# Patient Record
Sex: FEMALE | Race: WHITE | URBAN - METROPOLITAN AREA
[De-identification: names, ages, dates, MRNs, and addresses within clinical notes are randomized per-mention and may not be internally consistent; named-entity substitution may affect disease eponyms.]

---

## 2023-01-06 ENCOUNTER — OFFICE VISIT (OUTPATIENT)
Dept: URBAN - METROPOLITAN AREA CLINIC 107 | Facility: CLINIC | Age: 28
End: 2023-01-06
Payer: COMMERCIAL

## 2023-01-06 ENCOUNTER — TELEPHONE ENCOUNTER (OUTPATIENT)
Dept: URBAN - METROPOLITAN AREA CLINIC 113 | Facility: CLINIC | Age: 28
End: 2023-01-06

## 2023-01-06 VITALS
WEIGHT: 143 LBS | TEMPERATURE: 98.3 F | HEIGHT: 67 IN | DIASTOLIC BLOOD PRESSURE: 64 MMHG | BODY MASS INDEX: 22.44 KG/M2 | SYSTOLIC BLOOD PRESSURE: 106 MMHG | HEART RATE: 81 BPM

## 2023-01-06 DIAGNOSIS — R10.13 EPIGASTRIC BURNING SENSATION: ICD-10-CM

## 2023-01-06 DIAGNOSIS — Z79.1 NSAID LONG-TERM USE: ICD-10-CM

## 2023-01-06 PROCEDURE — 99204 OFFICE O/P NEW MOD 45 MIN: CPT

## 2023-01-06 RX ORDER — SUCRALFATE 1 G/1
1 TABLET ON AN EMPTY STOMACH TABLET ORAL TWICE A DAY
Status: ACTIVE | COMMUNITY

## 2023-01-06 RX ORDER — PANTOPRAZOLE SODIUM 40 MG/1
1 TABLET TABLET, DELAYED RELEASE ORAL ONCE A DAY
Qty: 90 TABLET | Refills: 2 | OUTPATIENT
Start: 2023-01-06

## 2023-01-06 NOTE — HPI-TODAY'S VISIT:
27-year-old female presents for evaluation of epigastric abdominal pain.  She presented to ED at [facility] on 1/5/2023 with reports of epigastric pain ongoing for some time.  This radiated to the right upper quadrant.  Labs revealed normal CBC, CMP, lipase and urinalysis.  Abdominal ultrasound was normal.  Abdominal x-ray revealed no acute abnormality in the abdomen.  She awoke a few nights ago with severe epigastric burning pain. This happened a second time and last all night. This resolved the next morning after eating. This is when she presented to the ER. She has had relief from sucralfate until 4:30 this morning. The pain is now sharp along the bilateral costal margins. Hunger worsens the pain and eating somewhat helps. Denies nausea or vomiting. She has a chronic history of constipation however her bowels are more regular as of late. She attributes this to stress. She denies any recent rectal bleeding, but she has hemorrhoids in the past. Denies melena. Denies heartburn or dysphagia. Denies a family history of colon  cancer or GI cancers.   She does take ibuprofen at least every other day for headaches. She took 800 mg ibuprofen prior to the onset of pain for three days due to a head cold. She has been taking Ibuprofen for years.

## 2023-02-08 ENCOUNTER — TELEPHONE ENCOUNTER (OUTPATIENT)
Dept: URBAN - METROPOLITAN AREA CLINIC 113 | Facility: CLINIC | Age: 28
End: 2023-02-08

## 2023-02-08 ENCOUNTER — TELEPHONE ENCOUNTER (OUTPATIENT)
Dept: URBAN - METROPOLITAN AREA CLINIC 107 | Facility: CLINIC | Age: 28
End: 2023-02-08

## 2023-02-24 ENCOUNTER — OFFICE VISIT (OUTPATIENT)
Dept: URBAN - METROPOLITAN AREA SURGERY CENTER 25 | Facility: SURGERY CENTER | Age: 28
End: 2023-02-24

## 2023-03-24 ENCOUNTER — OFFICE VISIT (OUTPATIENT)
Dept: URBAN - METROPOLITAN AREA CLINIC 107 | Facility: CLINIC | Age: 28
End: 2023-03-24

## 2023-03-24 NOTE — HPI-TODAY'S VISIT:
27-year-old female presents for follow-up after EGD.  She was last seen on 1/6/2023.  She reported a new onset of epigastric burning with history of long-term excessive NSAID use.  ER evaluation included ultrasound, abdominal x-ray and labs was unremarkable.  Suspected PUD or gastritis.  She was planned for an EGD and started on a PPI. Patient called the office on 2/8 stating burning had resolved on medication.  She wished to defer EGD.  1/6/2023 27-year-old female presents for evaluation of epigastric abdominal pain.  She presented to ED at [facility] on 1/5/2023 with reports of epigastric pain ongoing for some time.  This radiated to the right upper quadrant.  Labs revealed normal CBC, CMP, lipase and urinalysis.  Abdominal ultrasound was normal.  Abdominal x-ray revealed no acute abnormality in the abdomen.  She awoke a few nights ago with severe epigastric burning pain. This happened a second time and last all night. This resolved the next morning after eating. This is when she presented to the ER. She has had relief from sucralfate until 4:30 this morning. The pain is now sharp along the bilateral costal margins. Hunger worsens the pain and eating somewhat helps. Denies nausea or vomiting. She has a chronic history of constipation however her bowels are more regular as of late. She attributes this to stress. She denies any recent rectal bleeding, but she has hemorrhoids in the past. Denies melena. Denies heartburn or dysphagia. Denies a family history of colon  cancer or GI cancers.   She does take ibuprofen at least every other day for headaches. She took 800 mg ibuprofen prior to the onset of pain for three days due to a head cold. She has been taking Ibuprofen for years.

## 2023-04-18 ENCOUNTER — OFFICE VISIT (OUTPATIENT)
Dept: URBAN - METROPOLITAN AREA CLINIC 107 | Facility: CLINIC | Age: 28
End: 2023-04-18

## 2023-04-18 RX ORDER — SUCRALFATE 1 G/1
1 TABLET ON AN EMPTY STOMACH TABLET ORAL TWICE A DAY
Status: ACTIVE | COMMUNITY

## 2023-04-18 RX ORDER — PANTOPRAZOLE SODIUM 40 MG/1
1 TABLET TABLET, DELAYED RELEASE ORAL ONCE A DAY
Qty: 90 TABLET | Refills: 2 | Status: ACTIVE | COMMUNITY
Start: 2023-01-06

## 2023-04-18 NOTE — HPI-TODAY'S VISIT:
This is a 27-year-old female with a history of epigastric burning and long-term excessive NSAID use presenting for follow-up after EGD. She was initially seen 1/6/2023 for evaluation of epigastric pain.  She had been to the emergency department at [facility] where she was evaluated with an abdominal ultrasound, abdominal x-ray, and CBC, CMP, lipase, and urinalysis.  Testing was normal/negative.  Peptic ulcer disease or gastritis was suspected.  She was prescribed pantoprazole and scheduled for an EGD. EGD was scheduled 2/24/2023 and was canceled.

## 2023-05-02 ENCOUNTER — TELEPHONE ENCOUNTER (OUTPATIENT)
Dept: URBAN - METROPOLITAN AREA CLINIC 107 | Facility: CLINIC | Age: 28
End: 2023-05-02

## 2023-07-14 ENCOUNTER — OFFICE VISIT (OUTPATIENT)
Dept: URBAN - METROPOLITAN AREA CLINIC 107 | Facility: CLINIC | Age: 28
End: 2023-07-14
Payer: COMMERCIAL

## 2023-07-14 VITALS
SYSTOLIC BLOOD PRESSURE: 96 MMHG | HEART RATE: 81 BPM | BODY MASS INDEX: 20.72 KG/M2 | DIASTOLIC BLOOD PRESSURE: 72 MMHG | TEMPERATURE: 97.3 F | WEIGHT: 132 LBS | RESPIRATION RATE: 16 BRPM | HEIGHT: 67 IN

## 2023-07-14 DIAGNOSIS — R10.13 EPIGASTRIC BURNING SENSATION: ICD-10-CM

## 2023-07-14 DIAGNOSIS — R19.5 LOOSE STOOLS: ICD-10-CM

## 2023-07-14 DIAGNOSIS — Z79.1 NSAID LONG-TERM USE: ICD-10-CM

## 2023-07-14 DIAGNOSIS — R63.4 WEIGHT LOSS: ICD-10-CM

## 2023-07-14 DIAGNOSIS — R11.0 NAUSEA: ICD-10-CM

## 2023-07-14 PROCEDURE — 99214 OFFICE O/P EST MOD 30 MIN: CPT

## 2023-07-14 RX ORDER — SUCRALFATE 1 G/1
1 TABLET ON AN EMPTY STOMACH TABLET ORAL TWICE A DAY
Status: ON HOLD | COMMUNITY

## 2023-07-14 RX ORDER — PANTOPRAZOLE SODIUM 40 MG/1
1 TABLET TABLET, DELAYED RELEASE ORAL TWICE DAILY
Qty: 180 | Refills: 1 | OUTPATIENT

## 2023-07-14 RX ORDER — PANTOPRAZOLE SODIUM 40 MG/1
1 TABLET TABLET, DELAYED RELEASE ORAL ONCE A DAY
Qty: 90 TABLET | Refills: 2 | Status: ACTIVE | COMMUNITY
Start: 2023-01-06

## 2023-07-14 NOTE — HPI-TODAY'S VISIT:
This is a 27-year-old female with a history of epigastric burning and long-term excessive NSAID use presenting for follow-up after EGD. She was initially seen 1/6/2023 for evaluation of epigastric pain.  She had been to the emergency department at [facility] where she was evaluated with an abdominal ultrasound, abdominal x-ray, and CBC, CMP, lipase, and urinalysis.  Testing was normal/negative.  Peptic ulcer disease or gastritis was suspected.  She was prescribed pantoprazole and scheduled for an EGD. EGD was scheduled 2/24/2023 and was canceled.  She states the EGD was too expensive however her symptoms had resolved on the PPI. Last week she began having recurrent epigastric burning, nausea and shakiness in the mornings. This is sometimes followed by 2-3 somewhat soft bowel movements. Symptoms seem to be driven by greasy, fatty foods. Denies vomiting or blood per rectum. She has also lost 10 pounds since her last visit dur to lack of appetite.

## 2023-08-01 ENCOUNTER — WEB ENCOUNTER (OUTPATIENT)
Dept: URBAN - METROPOLITAN AREA SURGERY CENTER 25 | Facility: SURGERY CENTER | Age: 28
End: 2023-08-01

## 2023-08-07 ENCOUNTER — OUT OF OFFICE VISIT (OUTPATIENT)
Dept: URBAN - METROPOLITAN AREA SURGERY CENTER 25 | Facility: SURGERY CENTER | Age: 28
End: 2023-08-07
Payer: COMMERCIAL

## 2023-08-07 ENCOUNTER — CLAIMS CREATED FROM THE CLAIM WINDOW (OUTPATIENT)
Dept: URBAN - METROPOLITAN AREA CLINIC 4 | Facility: CLINIC | Age: 28
End: 2023-08-07
Payer: COMMERCIAL

## 2023-08-07 ENCOUNTER — TELEPHONE ENCOUNTER (OUTPATIENT)
Dept: URBAN - METROPOLITAN AREA CLINIC 107 | Facility: CLINIC | Age: 28
End: 2023-08-07

## 2023-08-07 DIAGNOSIS — R10.13 EPIGASTRIC PAIN: ICD-10-CM

## 2023-08-07 DIAGNOSIS — K31.A0 GASTRIC INTESTINAL METAPLASIA, UNSPECIFIED: ICD-10-CM

## 2023-08-07 DIAGNOSIS — R12 HEARTBURN: ICD-10-CM

## 2023-08-07 DIAGNOSIS — K29.70 GASTRITIS, UNSPECIFIED, WITHOUT BLEEDING: ICD-10-CM

## 2023-08-07 DIAGNOSIS — R10.13 ABDOMINAL DISCOMFORT, EPIGASTRIC: ICD-10-CM

## 2023-08-07 DIAGNOSIS — K29.60 OTHER GASTRITIS WITHOUT BLEEDING: ICD-10-CM

## 2023-08-07 PROCEDURE — 00731 ANES UPR GI NDSC PX NOS: CPT | Performed by: ANESTHESIOLOGY

## 2023-08-07 PROCEDURE — 00731 ANES UPR GI NDSC PX NOS: CPT | Performed by: ANESTHESIOLOGIST ASSISTANT

## 2023-08-07 PROCEDURE — 43239 EGD BIOPSY SINGLE/MULTIPLE: CPT | Performed by: INTERNAL MEDICINE

## 2023-08-07 PROCEDURE — G8907 PT DOC NO EVENTS ON DISCHARG: HCPCS | Performed by: INTERNAL MEDICINE

## 2023-08-07 PROCEDURE — 88305 TISSUE EXAM BY PATHOLOGIST: CPT | Performed by: PATHOLOGY

## 2023-08-07 PROCEDURE — 88342 IMHCHEM/IMCYTCHM 1ST ANTB: CPT | Performed by: PATHOLOGY

## 2023-08-07 RX ORDER — PANTOPRAZOLE SODIUM 40 MG/1
1 TABLET TABLET, DELAYED RELEASE ORAL TWICE DAILY
Qty: 180 | Refills: 1 | Status: ACTIVE | COMMUNITY

## 2023-08-07 RX ORDER — SUCRALFATE 1 G/1
1 TABLET ON AN EMPTY STOMACH TABLET ORAL TWICE A DAY
Status: ON HOLD | COMMUNITY

## 2023-09-29 ENCOUNTER — OFFICE VISIT (OUTPATIENT)
Dept: URBAN - METROPOLITAN AREA CLINIC 107 | Facility: CLINIC | Age: 28
End: 2023-09-29
Payer: COMMERCIAL

## 2023-09-29 ENCOUNTER — DASHBOARD ENCOUNTERS (OUTPATIENT)
Age: 28
End: 2023-09-29

## 2023-09-29 VITALS
HEIGHT: 67 IN | HEART RATE: 69 BPM | WEIGHT: 127 LBS | TEMPERATURE: 98.2 F | BODY MASS INDEX: 19.93 KG/M2 | RESPIRATION RATE: 16 BRPM | SYSTOLIC BLOOD PRESSURE: 94 MMHG | DIASTOLIC BLOOD PRESSURE: 67 MMHG

## 2023-09-29 DIAGNOSIS — R63.4 WEIGHT LOSS: ICD-10-CM

## 2023-09-29 DIAGNOSIS — Z79.1 NSAID LONG-TERM USE: ICD-10-CM

## 2023-09-29 DIAGNOSIS — R10.13 EPIGASTRIC BURNING SENSATION: ICD-10-CM

## 2023-09-29 DIAGNOSIS — R11.0 NAUSEA: ICD-10-CM

## 2023-09-29 DIAGNOSIS — R09.89 GLOBUS SENSATION: ICD-10-CM

## 2023-09-29 DIAGNOSIS — K29.50 CHRONIC GASTRITIS WITHOUT BLEEDING, UNSPECIFIED GASTRITIS TYPE: ICD-10-CM

## 2023-09-29 DIAGNOSIS — R19.5 LOOSE STOOLS: ICD-10-CM

## 2023-09-29 PROBLEM — 8493009: Status: ACTIVE | Noted: 2023-09-29

## 2023-09-29 PROCEDURE — 99214 OFFICE O/P EST MOD 30 MIN: CPT

## 2023-09-29 PROCEDURE — 91065 BREATH HYDROGEN/METHANE TEST: CPT

## 2023-09-29 RX ORDER — SUCRALFATE 1 G/1
1 TABLET ON AN EMPTY STOMACH TABLET ORAL TWICE A DAY
Status: ON HOLD | COMMUNITY

## 2023-09-29 RX ORDER — FAMOTIDINE 40 MG/1
1 TABLET AT BEDTIME AS NEEDED TABLET, FILM COATED ORAL TWICE A DAY
Status: ACTIVE | COMMUNITY

## 2023-09-29 RX ORDER — PANTOPRAZOLE SODIUM 40 MG/1
1 TABLET TABLET, DELAYED RELEASE ORAL TWICE DAILY
Qty: 180 | Refills: 1 | Status: ON HOLD | COMMUNITY

## 2023-09-29 NOTE — HPI-TODAY'S VISIT:
28-year-old female presents for follow-up.  She was planned for EGD and her PPI was increased to twice daily.  She was also planned for HIDA scan to rule out gallbladder dysfunction as well as celiac serologies.  EGD 8/7/2023: Performed without difficulty.  Normal esophagus.  Normal duodenum.  Gastritis noted and biopsied.  Pathology revealed chronic inactive gastritis with histological changes suggestive of treated H. pylori gastritis, negative for H. pylori or intestinal metaplasia.  Repeat EGD as needed for surveillance.  HIDA scan 8/10/2023: Patent cystic and common bile ducts.  No evidence of acute cholecystitis.  Normal gallbladder ejection fraction of 92%.  She has not had as much epigastric pain however she does complain of a lump in her throat and constant drainage. The feeling her throat "drives her craxy." She was unaware of the blood work ordered at her last visit. She has never had H pylori. She took Pantoprazole BID x 1 month without improvement.  7/14/2023:  This is a 27-year-old female with a history of epigastric burning and long-term excessive NSAID use presenting for follow-up after EGD.  She was initially seen 1/6/2023 for evaluation of epigastric pain.  She had been to the emergency department at [facility] where she was evaluated with an abdominal ultrasound, abdominal x-ray, and CBC, CMP, lipase, and urinalysis.  Testing was normal/negative.  Peptic ulcer disease or gastritis was suspected.  She was prescribed pantoprazole and scheduled for an EGD.  EGD was scheduled 2/24/2023 and was canceled.  She states the EGD was too expensive however her symptoms had resolved on the PPI. Last week she began having recurrent epigastric burning, nausea and shakiness in the mornings. This is sometimes followed by 2-3 somewhat soft bowel movements. Symptoms seem to be driven by greasy, fatty foods. Denies vomiting or blood per rectum. She has also lost 10 pounds since her last visit due to lack of appetite.

## 2023-12-28 ENCOUNTER — OFFICE VISIT (OUTPATIENT)
Dept: URBAN - METROPOLITAN AREA CLINIC 107 | Facility: CLINIC | Age: 28
End: 2023-12-28

## 2023-12-28 RX ORDER — SUCRALFATE 1 G/1
1 TABLET ON AN EMPTY STOMACH TABLET ORAL TWICE A DAY
Status: ON HOLD | COMMUNITY

## 2023-12-28 RX ORDER — PANTOPRAZOLE SODIUM 40 MG/1
1 TABLET TABLET, DELAYED RELEASE ORAL TWICE DAILY
Qty: 180 | Refills: 1 | Status: ON HOLD | COMMUNITY

## 2023-12-28 RX ORDER — FAMOTIDINE 40 MG/1
1 TABLET AT BEDTIME AS NEEDED TABLET, FILM COATED ORAL TWICE A DAY
Status: ACTIVE | COMMUNITY

## 2023-12-28 NOTE — HPI-TODAY'S VISIT:
28-year-old female presents for follow-up.  She was last seen on 9/29/2023.  She was tested for H. pylori to confirm there is no active infection.  She was recommended trial of daily antihistamine for possible postnasal drip.  If no improvement we would consider BuSpar. Labs 10/10/2023:Negative tissue transglutaminase IgA, negative H. pylori breath test. 9/29/2023: 28-year-old female presents for follow-up.  She was planned for EGD and her PPI was increased to twice daily.  She was also planned for HIDA scan to rule out gallbladder dysfunction as well as celiac serologies.  EGD 8/7/2023: Performed without difficulty.  Normal esophagus.  Normal duodenum.  Gastritis noted and biopsied.  Pathology revealed chronic inactive gastritis with histological changes suggestive of treated H. pylori gastritis, negative for H. pylori or intestinal metaplasia.  Repeat EGD as needed for surveillance.  HIDA scan 8/10/2023: Patent cystic and common bile ducts.  No evidence of acute cholecystitis.  Normal gallbladder ejection fraction of 92%.  She has not had as much epigastric pain however she does complain of a lump in her throat and constant drainage. The feeling her throat "drives her craxy." She was unaware of the blood work ordered at her last visit. She has never had H pylori. She took Pantoprazole BID x 1 month without improvement.  7/14/2023:  This is a 27-year-old female with a history of epigastric burning and long-term excessive NSAID use presenting for follow-up after EGD.  She was initially seen 1/6/2023 for evaluation of epigastric pain.  She had been to the emergency department at [facility] where she was evaluated with an abdominal ultrasound, abdominal x-ray, and CBC, CMP, lipase, and urinalysis.  Testing was normal/negative.  Peptic ulcer disease or gastritis was suspected.  She was prescribed pantoprazole and scheduled for an EGD.  EGD was scheduled 2/24/2023 and was canceled.  She states the EGD was too expensive however her symptoms had resolved on the PPI. Last week she began having recurrent epigastric burning, nausea and shakiness in the mornings. This is sometimes followed by 2-3 somewhat soft bowel movements. Symptoms seem to be driven by greasy, fatty foods. Denies vomiting or blood per rectum. She has also lost 10 pounds since her last visit due to lack of appetite.